# Patient Record
Sex: FEMALE | Race: WHITE
[De-identification: names, ages, dates, MRNs, and addresses within clinical notes are randomized per-mention and may not be internally consistent; named-entity substitution may affect disease eponyms.]

---

## 2017-04-19 ENCOUNTER — HOSPITAL ENCOUNTER (OUTPATIENT)
Dept: HOSPITAL 45 - C.LABBFT | Age: 72
Discharge: HOME | End: 2017-04-19
Attending: INTERNAL MEDICINE
Payer: COMMERCIAL

## 2017-04-19 DIAGNOSIS — E11.29: Primary | ICD-10-CM

## 2017-04-19 LAB
ALBUMIN/GLOB SERPL: 1.1 {RATIO} (ref 0.9–2)
ALP SERPL-CCNC: 55 U/L (ref 45–117)
ALT SERPL-CCNC: 19 U/L (ref 12–78)
ANION GAP SERPL CALC-SCNC: 9 MMOL/L (ref 3–11)
AST SERPL-CCNC: 11 U/L (ref 15–37)
BASOPHILS # BLD: 0.04 K/UL (ref 0–0.2)
BASOPHILS NFR BLD: 0.5 %
BUN SERPL-MCNC: 18 MG/DL (ref 7–18)
BUN/CREAT SERPL: 21.7 (ref 10–20)
CALCIUM SERPL-MCNC: 8.9 MG/DL (ref 8.5–10.1)
CHLORIDE SERPL-SCNC: 105 MMOL/L (ref 98–107)
CHOLEST/HDLC SERPL: 1.8 {RATIO}
CO2 SERPL-SCNC: 27 MMOL/L (ref 21–32)
COMPLETE: YES
CREAT SERPL-MCNC: 0.81 MG/DL (ref 0.6–1.2)
EOSINOPHIL NFR BLD AUTO: 285 K/UL (ref 130–400)
EST. AVERAGE GLUCOSE BLD GHB EST-MCNC: 186 MG/DL
GLOBULIN SER-MCNC: 3.3 GM/DL (ref 2.5–4)
GLUCOSE SERPL-MCNC: 175 MG/DL (ref 70–99)
GLUCOSE UR QL: 83 MG/DL
HCT VFR BLD CALC: 39.4 % (ref 37–47)
IG%: 0.9 %
IMM GRANULOCYTES NFR BLD AUTO: 9.1 %
KETONES UR QL STRIP: 35 MG/DL
LYMPHOCYTES # BLD: 0.69 K/UL (ref 1.2–3.4)
MCH RBC QN AUTO: 26.3 PG (ref 25–34)
MCHC RBC AUTO-ENTMCNC: 31.5 G/DL (ref 32–36)
MCV RBC AUTO: 83.7 FL (ref 80–100)
MONOCYTES NFR BLD: 10.7 %
NEUTROPHILS # BLD AUTO: 2.4 %
NEUTROPHILS NFR BLD AUTO: 76.4 %
NITRITE UR QL STRIP: 159 MG/DL (ref 0–150)
PH UR: 150 MG/DL (ref 0–200)
PMV BLD AUTO: 10.1 FL (ref 7.4–10.4)
POTASSIUM SERPL-SCNC: 3.8 MMOL/L (ref 3.5–5.1)
RBC # BLD AUTO: 4.71 M/UL (ref 4.2–5.4)
SODIUM SERPL-SCNC: 141 MMOL/L (ref 136–145)
TSH SERPL-ACNC: 2.8 UIU/ML (ref 0.3–4.5)
VERY LOW DENSITY LIPOPROT CALC: 32 MG/DL
WBC # BLD AUTO: 7.55 K/UL (ref 4.8–10.8)

## 2017-12-28 ENCOUNTER — HOSPITAL ENCOUNTER (OUTPATIENT)
Dept: HOSPITAL 45 - C.MRI | Age: 72
Discharge: HOME | End: 2017-12-28
Attending: INTERNAL MEDICINE
Payer: COMMERCIAL

## 2017-12-28 DIAGNOSIS — K86.2: ICD-10-CM

## 2017-12-28 DIAGNOSIS — D49.0: Primary | ICD-10-CM

## 2017-12-28 NOTE — DIAGNOSTIC IMAGING REPORT
MRCP



HISTORY: Follow-up.  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



TECHNIQUE: MRCP the abdomen was performed without the use of intravenous

contrast according to standard departmental protocol.



COMPARISON STUDY:  MRCP 1/15/2016 and 12/28/2017.



FINDINGS: The liver, spleen, adrenal glands, left kidney are unremarkable.

Stable 5 mm cyst within the right kidney. There is also a 9 mm cyst within the

lower pole the right kidney. Of note, the kidneys, pancreatic tail, stomach,

spleen, and lumbar spine are partially obscured by artifact. No hydronephrosis.

No retroperitoneal lymphadenopathy. The common bile duct is normal in course and

caliber. No filling defects within the common bile duct. The main pancreatic

duct is also normal in course and caliber. No intrahepatic bile duct dilatation.

The intrahepatic bile ducts are not well visualized due to the motion artifact.

Stable 9 mm cystic focus at the uncinate process of the pancreas. There are few

additional scattered small T2 hyperintense foci seen within the pancreas

measuring up to 2 mm. These may also represent tiny side branch IPMN's. These

are of doubtful clinical significance. Prior cholecystectomy.



IMPRESSION:  

No change in the 9 mm cystic lesion at the uncinate process of the pancreas.

This is consistent with a side branch intraductal papillary mucinous neoplasm.







Electronically signed by:  Osiel Montes M.D.

12/28/2017 11:00 AM



Dictated Date/Time:  12/28/2017 10:53 AM

## 2018-08-07 ENCOUNTER — HOSPITAL ENCOUNTER (OUTPATIENT)
Dept: HOSPITAL 45 - C.LABBFT | Age: 73
Discharge: HOME | End: 2018-08-07
Attending: INTERNAL MEDICINE
Payer: COMMERCIAL

## 2018-08-07 DIAGNOSIS — E11.29: Primary | ICD-10-CM

## 2018-08-07 LAB
ALBUMIN SERPL-MCNC: 3.7 GM/DL (ref 3.4–5)
ALP SERPL-CCNC: 61 U/L (ref 45–117)
ALT SERPL-CCNC: 16 U/L (ref 12–78)
AST SERPL-CCNC: 12 U/L (ref 15–37)
BUN SERPL-MCNC: 13 MG/DL (ref 7–18)
CALCIUM SERPL-MCNC: 8.8 MG/DL (ref 8.5–10.1)
CO2 SERPL-SCNC: 27 MMOL/L (ref 21–32)
CREAT SERPL-MCNC: 0.79 MG/DL (ref 0.6–1.2)
GLUCOSE SERPL-MCNC: 187 MG/DL (ref 70–99)
HBA1C MFR BLD: 8.4 % (ref 4.5–5.6)
KETONES UR QL STRIP: 34 MG/DL
PH UR: 140 MG/DL (ref 0–200)
POTASSIUM SERPL-SCNC: 4.5 MMOL/L (ref 3.5–5.1)
PROT SERPL-MCNC: 7.4 GM/DL (ref 6.4–8.2)
SODIUM SERPL-SCNC: 138 MMOL/L (ref 136–145)